# Patient Record
Sex: FEMALE | Race: WHITE | NOT HISPANIC OR LATINO | Employment: FULL TIME | URBAN - METROPOLITAN AREA
[De-identification: names, ages, dates, MRNs, and addresses within clinical notes are randomized per-mention and may not be internally consistent; named-entity substitution may affect disease eponyms.]

---

## 2019-01-03 ENCOUNTER — HISTORICAL (OUTPATIENT)
Dept: ADMINISTRATIVE | Facility: HOSPITAL | Age: 39
End: 2019-01-03

## 2019-01-03 LAB
ABS NEUT (OLG): 5.3 X10(3)/MCL (ref 2.1–9.2)
ALBUMIN SERPL-MCNC: 4.5 GM/DL (ref 3.4–5)
ALBUMIN/GLOB SERPL: 1.73 {RATIO} (ref 1.5–2.5)
ALP SERPL-CCNC: 55 UNIT/L (ref 38–126)
ALT SERPL-CCNC: 14 UNIT/L (ref 7–52)
AST SERPL-CCNC: 18 UNIT/L (ref 15–37)
BILIRUB SERPL-MCNC: 0.4 MG/DL (ref 0.2–1)
BILIRUBIN DIRECT+TOT PNL SERPL-MCNC: 0.1 MG/DL (ref 0–0.5)
BILIRUBIN DIRECT+TOT PNL SERPL-MCNC: 0.3 MG/DL
BUN SERPL-MCNC: 8 MG/DL (ref 7–18)
CALCIUM SERPL-MCNC: 9.1 MG/DL (ref 8.5–10)
CHLORIDE SERPL-SCNC: 107 MMOL/L (ref 98–107)
CO2 SERPL-SCNC: 23 MMOL/L (ref 21–32)
CREAT SERPL-MCNC: 0.71 MG/DL (ref 0.6–1.3)
ERYTHROCYTE [DISTWIDTH] IN BLOOD BY AUTOMATED COUNT: 11.4 % (ref 11.5–17)
GLOBULIN SER-MCNC: 2.6 GM/DL (ref 1.2–3)
GLUCOSE SERPL-MCNC: 102 MG/DL (ref 74–106)
HCT VFR BLD AUTO: 43.9 % (ref 37–47)
HGB BLD-MCNC: 13.3 GM/DL (ref 12–16)
LYMPHOCYTES # BLD AUTO: 1.6 X10(3)/MCL (ref 0.6–3.4)
LYMPHOCYTES NFR BLD AUTO: 20.6 % (ref 13–40)
MCH RBC QN AUTO: 30.3 PG (ref 27–31.2)
MCHC RBC AUTO-ENTMCNC: 30 GM/DL (ref 32–36)
MCV RBC AUTO: 100 FL (ref 80–94)
MONOCYTES # BLD AUTO: 0.7 X10(3)/MCL (ref 0.1–1.3)
MONOCYTES NFR BLD AUTO: 9.2 % (ref 0.1–24)
NEUTROPHILS NFR BLD AUTO: 70.2 % (ref 47–80)
PLATELET # BLD AUTO: 233 X10(3)/MCL (ref 130–400)
PMV BLD AUTO: 10.7 FL (ref 9.4–12.4)
POTASSIUM SERPL-SCNC: 4.2 MMOL/L (ref 3.5–5.1)
PROT SERPL-MCNC: 7.1 GM/DL (ref 6.4–8.2)
RBC # BLD AUTO: 4.39 X10(6)/MCL (ref 4.2–5.4)
SODIUM SERPL-SCNC: 138 MMOL/L (ref 136–145)
TSH SERPL-ACNC: 1.49 MIU/ML (ref 0.35–4.94)
WBC # SPEC AUTO: 7.6 X10(3)/MCL (ref 4.5–11.5)

## 2019-01-16 ENCOUNTER — HISTORICAL (OUTPATIENT)
Dept: ADMINISTRATIVE | Facility: HOSPITAL | Age: 39
End: 2019-01-16

## 2019-01-16 LAB
CHOLEST SERPL-MCNC: 155 MG/DL (ref 0–200)
CHOLEST/HDLC SERPL: 2.7 {RATIO} (ref 0–4)
D DIMER PPP IA.FEU-MCNC: <270 NG/ML FEU (ref 0–500)
HDLC SERPL-MCNC: 58 MG/DL (ref 35–60)
LDLC SERPL CALC-MCNC: 84 MG/DL (ref 0–129)
MAGNESIUM SERPL-MCNC: 2.3 MG/DL (ref 1.8–2.4)
TRIGL SERPL-MCNC: 66 MG/DL (ref 30–150)
VLDLC SERPL CALC-MCNC: 13 MG/DL

## 2021-03-17 LAB — PAP RECOMMENDATION EXT: NORMAL

## 2021-04-26 ENCOUNTER — HISTORICAL (OUTPATIENT)
Dept: ADMINISTRATIVE | Facility: HOSPITAL | Age: 41
End: 2021-04-26

## 2021-04-26 LAB
ABS NEUT (OLG): 3.51 X10(3)/MCL (ref 2.1–9.2)
ALBUMIN SERPL-MCNC: 4.4 GM/DL (ref 3.5–5)
ALBUMIN/GLOB SERPL: 1.8 RATIO (ref 1.1–2)
ALP SERPL-CCNC: 73 UNIT/L (ref 40–150)
ALT SERPL-CCNC: 17 UNIT/L (ref 0–55)
AST SERPL-CCNC: 19 UNIT/L (ref 5–34)
BASOPHILS # BLD AUTO: 0 X10(3)/MCL (ref 0–0.2)
BASOPHILS NFR BLD AUTO: 0 %
BILIRUB SERPL-MCNC: 0.3 MG/DL
BILIRUBIN DIRECT+TOT PNL SERPL-MCNC: 0.1 MG/DL (ref 0–0.5)
BILIRUBIN DIRECT+TOT PNL SERPL-MCNC: 0.2 MG/DL (ref 0–0.8)
BUN SERPL-MCNC: 9.3 MG/DL (ref 7–18.7)
CALCIUM SERPL-MCNC: 9.3 MG/DL (ref 8.4–10.2)
CHLORIDE SERPL-SCNC: 105 MMOL/L (ref 98–107)
CO2 SERPL-SCNC: 26 MMOL/L (ref 22–29)
CREAT SERPL-MCNC: 0.68 MG/DL (ref 0.55–1.02)
EOSINOPHIL # BLD AUTO: 0.1 X10(3)/MCL (ref 0–0.9)
EOSINOPHIL NFR BLD AUTO: 1 %
ERYTHROCYTE [DISTWIDTH] IN BLOOD BY AUTOMATED COUNT: 11.9 % (ref 11.5–17)
GLOBULIN SER-MCNC: 2.5 GM/DL (ref 2.4–3.5)
GLUCOSE SERPL-MCNC: 61 MG/DL (ref 74–100)
HCT VFR BLD AUTO: 41.9 % (ref 37–47)
HGB BLD-MCNC: 13.2 GM/DL (ref 12–16)
LYMPHOCYTES # BLD AUTO: 2.2 X10(3)/MCL (ref 0.6–4.6)
LYMPHOCYTES NFR BLD AUTO: 34 %
MCH RBC QN AUTO: 30.1 PG (ref 27–31)
MCHC RBC AUTO-ENTMCNC: 31.5 GM/DL (ref 33–36)
MCV RBC AUTO: 95.7 FL (ref 80–94)
MONOCYTES # BLD AUTO: 0.6 X10(3)/MCL (ref 0.1–1.3)
MONOCYTES NFR BLD AUTO: 9 %
NEUTROPHILS # BLD AUTO: 3.51 X10(3)/MCL (ref 2.1–9.2)
NEUTROPHILS NFR BLD AUTO: 54 %
PLATELET # BLD AUTO: 284 X10(3)/MCL (ref 130–400)
PMV BLD AUTO: 11.4 FL (ref 9.4–12.4)
POTASSIUM SERPL-SCNC: 4.1 MMOL/L (ref 3.5–5.1)
PROT SERPL-MCNC: 6.9 GM/DL (ref 6.4–8.3)
RBC # BLD AUTO: 4.38 X10(6)/MCL (ref 4.2–5.4)
SODIUM SERPL-SCNC: 142 MMOL/L (ref 136–145)
TSH SERPL-ACNC: 1.29 UIU/ML (ref 0.35–4.94)
WBC # SPEC AUTO: 6.5 X10(3)/MCL (ref 4.5–11.5)

## 2021-10-19 ENCOUNTER — HISTORICAL (OUTPATIENT)
Dept: ADMINISTRATIVE | Facility: HOSPITAL | Age: 41
End: 2021-10-19

## 2021-10-19 LAB
CHOLEST SERPL-MCNC: 176 MG/DL
CHOLEST/HDLC SERPL: 3 {RATIO} (ref 0–5)
DEPRECATED CALCIDIOL+CALCIFEROL SERPL-MC: 27.3 NG/ML (ref 30–80)
HDLC SERPL-MCNC: 55 MG/DL (ref 35–60)
LDLC SERPL CALC-MCNC: 110 MG/DL (ref 50–140)
TRIGL SERPL-MCNC: 57 MG/DL (ref 37–140)
VLDLC SERPL CALC-MCNC: 11 MG/DL

## 2022-04-10 ENCOUNTER — HISTORICAL (OUTPATIENT)
Dept: ADMINISTRATIVE | Facility: HOSPITAL | Age: 42
End: 2022-04-10

## 2022-04-29 VITALS
OXYGEN SATURATION: 98 % | SYSTOLIC BLOOD PRESSURE: 122 MMHG | HEIGHT: 69 IN | BODY MASS INDEX: 25.03 KG/M2 | WEIGHT: 169 LBS | DIASTOLIC BLOOD PRESSURE: 72 MMHG

## 2022-10-04 DIAGNOSIS — E55.9 VITAMIN D DEFICIENCY: ICD-10-CM

## 2022-10-04 DIAGNOSIS — Z00.00 WELLNESS EXAMINATION: Primary | ICD-10-CM

## 2022-10-04 RX ORDER — METOPROLOL SUCCINATE 25 MG/1
1 TABLET, EXTENDED RELEASE ORAL DAILY
COMMUNITY
Start: 2022-07-26

## 2022-10-05 ENCOUNTER — TELEPHONE (OUTPATIENT)
Dept: INTERNAL MEDICINE | Facility: CLINIC | Age: 42
End: 2022-10-05

## 2022-10-07 ENCOUNTER — LAB VISIT (OUTPATIENT)
Dept: LAB | Facility: HOSPITAL | Age: 42
End: 2022-10-07
Attending: INTERNAL MEDICINE
Payer: COMMERCIAL

## 2022-10-07 DIAGNOSIS — Z00.00 WELLNESS EXAMINATION: ICD-10-CM

## 2022-10-07 DIAGNOSIS — E55.9 VITAMIN D DEFICIENCY: ICD-10-CM

## 2022-10-07 LAB
ALBUMIN SERPL-MCNC: 4.2 GM/DL (ref 3.5–5)
ALBUMIN/GLOB SERPL: 1.8 RATIO (ref 1.1–2)
ALP SERPL-CCNC: 55 UNIT/L (ref 40–150)
ALT SERPL-CCNC: 14 UNIT/L (ref 0–55)
AST SERPL-CCNC: 14 UNIT/L (ref 5–34)
BASOPHILS # BLD AUTO: 0.04 X10(3)/MCL (ref 0–0.2)
BASOPHILS NFR BLD AUTO: 0.6 %
BILIRUBIN DIRECT+TOT PNL SERPL-MCNC: 0.9 MG/DL
BUN SERPL-MCNC: 11.7 MG/DL (ref 7–18.7)
CALCIUM SERPL-MCNC: 9.6 MG/DL (ref 8.4–10.2)
CHLORIDE SERPL-SCNC: 108 MMOL/L (ref 98–107)
CHOLEST SERPL-MCNC: 144 MG/DL
CHOLEST/HDLC SERPL: 3 {RATIO} (ref 0–5)
CO2 SERPL-SCNC: 28 MMOL/L (ref 22–29)
CREAT SERPL-MCNC: 0.69 MG/DL (ref 0.55–1.02)
DEPRECATED CALCIDIOL+CALCIFEROL SERPL-MC: 29.7 NG/ML (ref 30–80)
EOSINOPHIL # BLD AUTO: 0.1 X10(3)/MCL (ref 0–0.9)
EOSINOPHIL NFR BLD AUTO: 1.5 %
ERYTHROCYTE [DISTWIDTH] IN BLOOD BY AUTOMATED COUNT: 11.6 % (ref 11.5–17)
EST. AVERAGE GLUCOSE BLD GHB EST-MCNC: 93.9 MG/DL
GFR SERPLBLD CREATININE-BSD FMLA CKD-EPI: >60 MLS/MIN/1.73/M2
GLOBULIN SER-MCNC: 2.4 GM/DL (ref 2.4–3.5)
GLUCOSE SERPL-MCNC: 82 MG/DL (ref 74–100)
HBA1C MFR BLD: 4.9 %
HCT VFR BLD AUTO: 41.2 % (ref 37–47)
HDLC SERPL-MCNC: 50 MG/DL (ref 35–60)
HGB BLD-MCNC: 13 GM/DL (ref 12–16)
IMM GRANULOCYTES # BLD AUTO: 0.01 X10(3)/MCL (ref 0–0.04)
IMM GRANULOCYTES NFR BLD AUTO: 0.2 %
LDLC SERPL CALC-MCNC: 85 MG/DL (ref 50–140)
LYMPHOCYTES # BLD AUTO: 2.15 X10(3)/MCL (ref 0.6–4.6)
LYMPHOCYTES NFR BLD AUTO: 32.6 %
MCH RBC QN AUTO: 29.6 PG (ref 27–31)
MCHC RBC AUTO-ENTMCNC: 31.6 MG/DL (ref 33–36)
MCV RBC AUTO: 93.8 FL (ref 80–94)
MONOCYTES # BLD AUTO: 0.6 X10(3)/MCL (ref 0.1–1.3)
MONOCYTES NFR BLD AUTO: 9.1 %
NEUTROPHILS # BLD AUTO: 3.7 X10(3)/MCL (ref 2.1–9.2)
NEUTROPHILS NFR BLD AUTO: 56 %
NRBC BLD AUTO-RTO: 0 %
PLATELET # BLD AUTO: 242 X10(3)/MCL (ref 130–400)
PMV BLD AUTO: 11.4 FL (ref 7.4–10.4)
POTASSIUM SERPL-SCNC: 4.4 MMOL/L (ref 3.5–5.1)
PROT SERPL-MCNC: 6.6 GM/DL (ref 6.4–8.3)
RBC # BLD AUTO: 4.39 X10(6)/MCL (ref 4.2–5.4)
SODIUM SERPL-SCNC: 141 MMOL/L (ref 136–145)
TRIGL SERPL-MCNC: 45 MG/DL (ref 37–140)
TSH SERPL-ACNC: 0.93 UIU/ML (ref 0.35–4.94)
VLDLC SERPL CALC-MCNC: 9 MG/DL
WBC # SPEC AUTO: 6.6 X10(3)/MCL (ref 4.5–11.5)

## 2022-10-07 PROCEDURE — 82306 VITAMIN D 25 HYDROXY: CPT

## 2022-10-07 PROCEDURE — 84443 ASSAY THYROID STIM HORMONE: CPT

## 2022-10-07 PROCEDURE — 36415 COLL VENOUS BLD VENIPUNCTURE: CPT

## 2022-10-07 PROCEDURE — 80053 COMPREHEN METABOLIC PANEL: CPT

## 2022-10-07 PROCEDURE — 83036 HEMOGLOBIN GLYCOSYLATED A1C: CPT

## 2022-10-07 PROCEDURE — 80061 LIPID PANEL: CPT

## 2022-10-07 PROCEDURE — 85025 COMPLETE CBC W/AUTO DIFF WBC: CPT

## 2022-10-08 ENCOUNTER — DOCUMENTATION ONLY (OUTPATIENT)
Dept: INTERNAL MEDICINE | Facility: CLINIC | Age: 42
End: 2022-10-08
Payer: COMMERCIAL

## 2022-10-13 ENCOUNTER — OFFICE VISIT (OUTPATIENT)
Dept: INTERNAL MEDICINE | Facility: CLINIC | Age: 42
End: 2022-10-13
Payer: COMMERCIAL

## 2022-10-13 VITALS
OXYGEN SATURATION: 99 % | HEART RATE: 74 BPM | WEIGHT: 148.19 LBS | SYSTOLIC BLOOD PRESSURE: 120 MMHG | DIASTOLIC BLOOD PRESSURE: 74 MMHG | TEMPERATURE: 98 F | HEIGHT: 68 IN | BODY MASS INDEX: 22.46 KG/M2

## 2022-10-13 DIAGNOSIS — I47.10 SVT (SUPRAVENTRICULAR TACHYCARDIA): ICD-10-CM

## 2022-10-13 DIAGNOSIS — F32.A ANXIETY AND DEPRESSION: ICD-10-CM

## 2022-10-13 DIAGNOSIS — F41.9 ANXIETY AND DEPRESSION: ICD-10-CM

## 2022-10-13 DIAGNOSIS — Z00.00 WELLNESS EXAMINATION: ICD-10-CM

## 2022-10-13 DIAGNOSIS — Z23 NEED FOR VACCINATION: Primary | ICD-10-CM

## 2022-10-13 PROCEDURE — 90471 IMMUNIZATION ADMIN: CPT | Mod: ,,, | Performed by: INTERNAL MEDICINE

## 2022-10-13 PROCEDURE — 90686 IIV4 VACC NO PRSV 0.5 ML IM: CPT | Mod: ,,, | Performed by: INTERNAL MEDICINE

## 2022-10-13 PROCEDURE — 1159F MED LIST DOCD IN RCRD: CPT | Mod: CPTII,,, | Performed by: INTERNAL MEDICINE

## 2022-10-13 PROCEDURE — 99212 OFFICE O/P EST SF 10 MIN: CPT | Mod: 25,,, | Performed by: INTERNAL MEDICINE

## 2022-10-13 PROCEDURE — 3078F PR MOST RECENT DIASTOLIC BLOOD PRESSURE < 80 MM HG: ICD-10-PCS | Mod: CPTII,,, | Performed by: INTERNAL MEDICINE

## 2022-10-13 PROCEDURE — 1159F PR MEDICATION LIST DOCUMENTED IN MEDICAL RECORD: ICD-10-PCS | Mod: CPTII,,, | Performed by: INTERNAL MEDICINE

## 2022-10-13 PROCEDURE — 99395 PREV VISIT EST AGE 18-39: CPT | Mod: 25,,, | Performed by: INTERNAL MEDICINE

## 2022-10-13 PROCEDURE — 1160F PR REVIEW ALL MEDS BY PRESCRIBER/CLIN PHARMACIST DOCUMENTED: ICD-10-PCS | Mod: CPTII,,, | Performed by: INTERNAL MEDICINE

## 2022-10-13 PROCEDURE — 99395 PR PREVENTIVE VISIT,EST,18-39: ICD-10-PCS | Mod: 25,,, | Performed by: INTERNAL MEDICINE

## 2022-10-13 PROCEDURE — 3074F SYST BP LT 130 MM HG: CPT | Mod: CPTII,,, | Performed by: INTERNAL MEDICINE

## 2022-10-13 PROCEDURE — 1160F RVW MEDS BY RX/DR IN RCRD: CPT | Mod: CPTII,,, | Performed by: INTERNAL MEDICINE

## 2022-10-13 PROCEDURE — 90471 FLU VACCINE (QUAD) GREATER THAN OR EQUAL TO 3YO PRESERVATIVE FREE IM: ICD-10-PCS | Mod: ,,, | Performed by: INTERNAL MEDICINE

## 2022-10-13 PROCEDURE — 3074F PR MOST RECENT SYSTOLIC BLOOD PRESSURE < 130 MM HG: ICD-10-PCS | Mod: CPTII,,, | Performed by: INTERNAL MEDICINE

## 2022-10-13 PROCEDURE — 3078F DIAST BP <80 MM HG: CPT | Mod: CPTII,,, | Performed by: INTERNAL MEDICINE

## 2022-10-13 PROCEDURE — 99212 PR OFFICE/OUTPT VISIT, EST, LEVL II, 10-19 MIN: ICD-10-PCS | Mod: 25,,, | Performed by: INTERNAL MEDICINE

## 2022-10-13 PROCEDURE — 90686 FLU VACCINE (QUAD) GREATER THAN OR EQUAL TO 3YO PRESERVATIVE FREE IM: ICD-10-PCS | Mod: ,,, | Performed by: INTERNAL MEDICINE

## 2022-10-13 NOTE — ASSESSMENT & PLAN NOTE
-status post ablation and now on metoprolol and remaining stable   -continue treatment with metoprolol

## 2022-10-13 NOTE — ASSESSMENT & PLAN NOTE
Continue on metoprolol which should help with the palpitations   Refraining from adding any pharmacological treatment however patient is advised to call if the measures discussed below do not help in symptom improvement   -will consider low dose of Celexa at that time    Practice deep breathing or abdominal breathing exercises when anxiety occurs.  Exercise daily. Get sunlight daily.  Avoid caffeine, alcohol and stimulants.  Practice positive phrases and repeat throughout the day, along with yoga and relaxation techniques.  Set healthy boundaries, avoid people and conversations that increase stress.  Educated patient on the risks of serotonin based medications such as serotonin modulators and SSRIs/SNRIs including common side effects of nausea, GI upset, headache dizziness as well as the rare risk for worsening symptoms of depression including development of suicidal thoughts or ideations, and serotonin syndrome.

## 2022-12-07 LAB — BCS RECOMMENDATION EXT: NORMAL

## 2022-12-10 ENCOUNTER — DOCUMENTATION ONLY (OUTPATIENT)
Dept: INTERNAL MEDICINE | Facility: CLINIC | Age: 42
End: 2022-12-10
Payer: COMMERCIAL

## 2023-01-16 PROBLEM — Z00.00 WELLNESS EXAMINATION: Status: RESOLVED | Noted: 2022-10-13 | Resolved: 2023-01-16

## 2023-04-05 ENCOUNTER — TELEPHONE (OUTPATIENT)
Dept: INTERNAL MEDICINE | Facility: CLINIC | Age: 43
End: 2023-04-05
Payer: COMMERCIAL

## 2023-04-13 ENCOUNTER — OFFICE VISIT (OUTPATIENT)
Dept: INTERNAL MEDICINE | Facility: CLINIC | Age: 43
End: 2023-04-13
Payer: COMMERCIAL

## 2023-04-13 VITALS
HEART RATE: 62 BPM | BODY MASS INDEX: 22.58 KG/M2 | HEIGHT: 68 IN | OXYGEN SATURATION: 99 % | WEIGHT: 149 LBS | SYSTOLIC BLOOD PRESSURE: 122 MMHG | DIASTOLIC BLOOD PRESSURE: 70 MMHG | TEMPERATURE: 99 F

## 2023-04-13 DIAGNOSIS — E55.9 VITAMIN D DEFICIENCY: Chronic | ICD-10-CM

## 2023-04-13 DIAGNOSIS — I47.10 SVT (SUPRAVENTRICULAR TACHYCARDIA): Chronic | ICD-10-CM

## 2023-04-13 DIAGNOSIS — F32.A ANXIETY AND DEPRESSION: Primary | Chronic | ICD-10-CM

## 2023-04-13 DIAGNOSIS — F41.9 ANXIETY AND DEPRESSION: Primary | Chronic | ICD-10-CM

## 2023-04-13 PROCEDURE — 1159F PR MEDICATION LIST DOCUMENTED IN MEDICAL RECORD: ICD-10-PCS | Mod: CPTII,,,

## 2023-04-13 PROCEDURE — 1159F MED LIST DOCD IN RCRD: CPT | Mod: CPTII,,,

## 2023-04-13 PROCEDURE — 3074F PR MOST RECENT SYSTOLIC BLOOD PRESSURE < 130 MM HG: ICD-10-PCS | Mod: CPTII,,,

## 2023-04-13 PROCEDURE — 1160F RVW MEDS BY RX/DR IN RCRD: CPT | Mod: CPTII,,,

## 2023-04-13 PROCEDURE — 3008F PR BODY MASS INDEX (BMI) DOCUMENTED: ICD-10-PCS | Mod: CPTII,,,

## 2023-04-13 PROCEDURE — 99214 PR OFFICE/OUTPT VISIT, EST, LEVL IV, 30-39 MIN: ICD-10-PCS | Mod: ,,,

## 2023-04-13 PROCEDURE — 1160F PR REVIEW ALL MEDS BY PRESCRIBER/CLIN PHARMACIST DOCUMENTED: ICD-10-PCS | Mod: CPTII,,,

## 2023-04-13 PROCEDURE — 3008F BODY MASS INDEX DOCD: CPT | Mod: CPTII,,,

## 2023-04-13 PROCEDURE — 99214 OFFICE O/P EST MOD 30 MIN: CPT | Mod: ,,,

## 2023-04-13 PROCEDURE — 3074F SYST BP LT 130 MM HG: CPT | Mod: CPTII,,,

## 2023-04-13 PROCEDURE — 3078F DIAST BP <80 MM HG: CPT | Mod: CPTII,,,

## 2023-04-13 PROCEDURE — 3078F PR MOST RECENT DIASTOLIC BLOOD PRESSURE < 80 MM HG: ICD-10-PCS | Mod: CPTII,,,

## 2023-04-13 NOTE — PROGRESS NOTES
Patient ID: Lyndsay Alicia is a 42 y.o. female.    Chief Complaint: Follow-up (6 mth f/u anxiety)    42-year-old female with SVT s/p ablation, anxiety, and vitamin-D deficiency here today for six-month follow-up visit.  In the past patient has had a cardiac work-up including an echocardiogram etc. which were all returned negative. However she had been having on and off tachycardia with heart rate sometimes going in the 200+ range. Was seen by Dr. Shaikh after a 2-week Holter monitor and is s/p ablation of her SVT.   Since last visit patient reports she is been fairly well without major injury or illness.  Does have some ongoing anxiety/heightened stresses, however still somewhat hesitant to initiate pharmacologic intervention.  Is open to homeopathic treatment modalities for anxiety/depression.  Discussed trial on Saint Ron's Wort  and Ashwagandha.  Does have vitamin-D deficiency subsequently encouraged to utilize OTC vitamin D 5000 units daily.  Otherwise patient fairly stable without acute concerns today.       Wellness: 10/13/22    Electrophysiology MD: Dr. Vitale      MEDICAL HISTORY:    Past Medical History:   Diagnosis Date    Pain     Vitamin D deficiency       Past Surgical History:   Procedure Laterality Date    CARDIAC ELECTROPHYSIOLOGY MAPPING AND ABLATION      EYE SURGERY  2006    I dont remember exactly      Social History     Tobacco Use    Smoking status: Never    Smokeless tobacco: Never   Substance Use Topics    Alcohol use: Not Currently          Health Maintenance Due   Topic Date Due    Hepatitis C Screening  Never done          Patient Care Team:  Amaya Feliciano MD as PCP - General (Internal Medicine)      Review of Systems   Constitutional:  Negative for fatigue and fever.   HENT:  Negative for congestion, rhinorrhea, sore throat and trouble swallowing.    Eyes:  Negative for redness and visual disturbance.   Respiratory:  Negative for cough, chest tightness and shortness of  "breath.    Cardiovascular:  Negative for chest pain and palpitations.   Gastrointestinal:  Negative for abdominal pain, constipation, diarrhea, nausea and vomiting.   Genitourinary:  Negative for dysuria, flank pain, frequency and urgency.   Musculoskeletal:  Negative for arthralgias, gait problem and myalgias.   Skin:  Negative for rash and wound.   Neurological:  Negative for facial asymmetry, speech difficulty, weakness and headaches.   Psychiatric/Behavioral:  The patient is nervous/anxious.    All other systems reviewed and are negative.    Objective:   /70 (BP Location: Left arm, Patient Position: Sitting, BP Method: Large (Manual))   Pulse 62   Temp 98.7 °F (37.1 °C)   Ht 5' 7.99" (1.727 m)   Wt 67.6 kg (149 lb)   SpO2 99%   BMI 22.66 kg/m²      Physical Exam  Constitutional:       General: She is not in acute distress.     Appearance: Normal appearance.   HENT:      Right Ear: Tympanic membrane, ear canal and external ear normal.      Left Ear: Tympanic membrane, ear canal and external ear normal.      Nose: Nose normal.      Mouth/Throat:      Mouth: Mucous membranes are moist.      Pharynx: Oropharynx is clear.   Eyes:      Extraocular Movements: Extraocular movements intact.      Conjunctiva/sclera: Conjunctivae normal.      Pupils: Pupils are equal, round, and reactive to light.   Cardiovascular:      Rate and Rhythm: Normal rate and regular rhythm.      Pulses: Normal pulses.      Heart sounds: Normal heart sounds. No murmur heard.    No gallop.   Pulmonary:      Effort: Pulmonary effort is normal.      Breath sounds: Normal breath sounds. No wheezing.   Abdominal:      General: Bowel sounds are normal. There is no distension.      Palpations: Abdomen is soft. There is no mass.      Tenderness: There is no abdominal tenderness. There is no guarding.   Musculoskeletal:         General: Normal range of motion.   Skin:     General: Skin is warm and dry.   Neurological:      Mental Status: She " is alert. Mental status is at baseline.      Sensory: No sensory deficit.      Motor: No weakness.         Assessment:       ICD-10-CM ICD-9-CM   1. Anxiety and depression  F41.9 300.00    F32.A 311   2. SVT (supraventricular tachycardia)  I47.1 427.89   3. Vitamin D deficiency  E55.9 268.9        Plan:     Problem List Items Addressed This Visit          Psychiatric    Anxiety and depression - Primary (Chronic)     -recent titrate homeopathic Pavan   -encouraged to utilize OTC Ashwagandha and Saint Ron's Wort   -also treating vitamin-D deficiency at the moment  -does take beta-blocker for SVT which may potentially helpful as well           Relevant Orders    TSH    Lipid Panel    Comprehensive Metabolic Panel    CBC Auto Differential    Vitamin D    Hemoglobin A1C       Cardiac/Vascular    SVT (supraventricular tachycardia) (Chronic)     -stable   -currently metoprolol succinate 25 mg daily, continue  -avoid energy drinks and excessive caffeine           Relevant Orders    TSH    Lipid Panel    Comprehensive Metabolic Panel    CBC Auto Differential    Vitamin D    Hemoglobin A1C       Endocrine    Vitamin D deficiency (Chronic)     -encouraged to utilize OTC vitamin-D 5000 units daily   -order vitamin-D level for next visit           Relevant Orders    TSH    Lipid Panel    Comprehensive Metabolic Panel    CBC Auto Differential    Vitamin D    Hemoglobin A1C          Follow up in about 6 months (around 10/13/2023) for \Wellness with labs prior to visit, Wellness with labs prior to visit.   -plan specifics discussed above    Orders Placed This Encounter    TSH    Lipid Panel    Comprehensive Metabolic Panel    CBC Auto Differential    Vitamin D    Hemoglobin A1C        Medication List with Changes/Refills   Current Medications    METOPROLOL SUCCINATE (TOPROL-XL) 25 MG 24 HR TABLET    Take 1 tablet by mouth Daily.

## 2023-04-13 NOTE — ASSESSMENT & PLAN NOTE
-stable   -currently metoprolol succinate 25 mg daily, continue  -avoid energy drinks and excessive caffeine

## 2023-04-13 NOTE — ASSESSMENT & PLAN NOTE
-recent titrate homeopathic Pavan   -encouraged to utilize OTC Ashwagandha and Saint Ron's Wort   -also treating vitamin-D deficiency at the moment  -does take beta-blocker for SVT which may potentially helpful as well

## 2023-07-11 ENCOUNTER — TELEPHONE (OUTPATIENT)
Dept: INTERNAL MEDICINE | Facility: CLINIC | Age: 43
End: 2023-07-11
Payer: COMMERCIAL

## 2023-07-11 NOTE — TELEPHONE ENCOUNTER
----- Message from Gwen Hatfield sent at 7/11/2023  8:41 AM CDT -----  .Type:  Needs Medical Advice    Who Called: pt  Symptoms (please be specific):    How long has patient had these symptoms:    Pharmacy name and phone #:    Would the patient rather a call back or a response via [facility]?   Best Call Back Number: [phone]  Additional Information: pt asking for Pcp give her call it's personal question that needs to be ask

## 2023-07-11 NOTE — TELEPHONE ENCOUNTER
I do not have any specific recommendations for therapist however they can [facility] the [facility] today and see what therapist would fit best for their needs.

## 2023-07-11 NOTE — TELEPHONE ENCOUNTER
Patient was told she can make appointment with [facility] [phone]. She will call to make appointment

## 2023-07-11 NOTE — TELEPHONE ENCOUNTER
----- Message from Gwen Hatfield sent at 7/11/2023  8:41 AM CDT -----  .Type:  Needs Medical Advice    Who Called: pt  Symptoms (please be specific):    How long has patient had these symptoms:    Pharmacy name and phone #:    Would the patient rather a call back or a response via [facility]?   Best Call Back Number: [phone]  Additional Information: pt asking for Pcp give her call it's personal question that needs to be ask

## 2023-08-16 LAB
HUMAN PAPILLOMAVIRUS (HPV): NORMAL
PAP RECOMMENDATION EXT: NORMAL
PAP SMEAR: NORMAL

## 2023-10-10 ENCOUNTER — TELEPHONE (OUTPATIENT)
Dept: INTERNAL MEDICINE | Facility: CLINIC | Age: 43
End: 2023-10-10
Payer: COMMERCIAL

## 2023-10-10 ENCOUNTER — LAB VISIT (OUTPATIENT)
Dept: LAB | Facility: HOSPITAL | Age: 43
End: 2023-10-10
Payer: COMMERCIAL

## 2023-10-10 DIAGNOSIS — I47.10 SVT (SUPRAVENTRICULAR TACHYCARDIA): ICD-10-CM

## 2023-10-10 DIAGNOSIS — F32.A ANXIETY AND DEPRESSION: ICD-10-CM

## 2023-10-10 DIAGNOSIS — F41.9 ANXIETY AND DEPRESSION: ICD-10-CM

## 2023-10-10 DIAGNOSIS — E55.9 VITAMIN D DEFICIENCY: ICD-10-CM

## 2023-10-10 LAB
ALBUMIN SERPL-MCNC: 4 G/DL (ref 3.5–5)
ALBUMIN/GLOB SERPL: 1.6 RATIO (ref 1.1–2)
ALP SERPL-CCNC: 51 UNIT/L (ref 40–150)
ALT SERPL-CCNC: 14 UNIT/L (ref 0–55)
AST SERPL-CCNC: 15 UNIT/L (ref 5–34)
BASOPHILS # BLD AUTO: 0.03 X10(3)/MCL
BASOPHILS NFR BLD AUTO: 0.6 %
BILIRUB SERPL-MCNC: 0.7 MG/DL
BUN SERPL-MCNC: 17 MG/DL (ref 7–18.7)
CALCIUM SERPL-MCNC: 9.4 MG/DL (ref 8.4–10.2)
CHLORIDE SERPL-SCNC: 107 MMOL/L (ref 98–107)
CHOLEST SERPL-MCNC: 156 MG/DL
CHOLEST/HDLC SERPL: 3 {RATIO} (ref 0–5)
CO2 SERPL-SCNC: 27 MMOL/L (ref 22–29)
CREAT SERPL-MCNC: 0.73 MG/DL (ref 0.55–1.02)
DEPRECATED CALCIDIOL+CALCIFEROL SERPL-MC: 61.4 NG/ML (ref 30–80)
EOSINOPHIL # BLD AUTO: 0.07 X10(3)/MCL (ref 0–0.9)
EOSINOPHIL NFR BLD AUTO: 1.4 %
ERYTHROCYTE [DISTWIDTH] IN BLOOD BY AUTOMATED COUNT: 11.5 % (ref 11.5–17)
EST. AVERAGE GLUCOSE BLD GHB EST-MCNC: 93.9 MG/DL
GFR SERPLBLD CREATININE-BSD FMLA CKD-EPI: >60 MLS/MIN/1.73/M2
GLOBULIN SER-MCNC: 2.5 GM/DL (ref 2.4–3.5)
GLUCOSE SERPL-MCNC: 94 MG/DL (ref 74–100)
HBA1C MFR BLD: 4.9 %
HCT VFR BLD AUTO: 40.7 % (ref 37–47)
HDLC SERPL-MCNC: 46 MG/DL (ref 35–60)
HGB BLD-MCNC: 13 G/DL (ref 12–16)
IMM GRANULOCYTES # BLD AUTO: 0.01 X10(3)/MCL (ref 0–0.04)
IMM GRANULOCYTES NFR BLD AUTO: 0.2 %
LDLC SERPL CALC-MCNC: 95 MG/DL (ref 50–140)
LYMPHOCYTES # BLD AUTO: 1.52 X10(3)/MCL (ref 0.6–4.6)
LYMPHOCYTES NFR BLD AUTO: 31 %
MCH RBC QN AUTO: 29.8 PG (ref 27–31)
MCHC RBC AUTO-ENTMCNC: 31.9 G/DL (ref 33–36)
MCV RBC AUTO: 93.3 FL (ref 80–94)
MONOCYTES # BLD AUTO: 0.48 X10(3)/MCL (ref 0.1–1.3)
MONOCYTES NFR BLD AUTO: 9.8 %
NEUTROPHILS # BLD AUTO: 2.79 X10(3)/MCL (ref 2.1–9.2)
NEUTROPHILS NFR BLD AUTO: 57 %
NRBC BLD AUTO-RTO: 0 %
PLATELET # BLD AUTO: 234 X10(3)/MCL (ref 130–400)
PMV BLD AUTO: 11 FL (ref 7.4–10.4)
POTASSIUM SERPL-SCNC: 4.2 MMOL/L (ref 3.5–5.1)
PROT SERPL-MCNC: 6.5 GM/DL (ref 6.4–8.3)
RBC # BLD AUTO: 4.36 X10(6)/MCL (ref 4.2–5.4)
SODIUM SERPL-SCNC: 140 MMOL/L (ref 136–145)
TRIGL SERPL-MCNC: 77 MG/DL (ref 37–140)
TSH SERPL-ACNC: 1.78 UIU/ML (ref 0.35–4.94)
VLDLC SERPL CALC-MCNC: 15 MG/DL
WBC # SPEC AUTO: 4.9 X10(3)/MCL (ref 4.5–11.5)

## 2023-10-10 PROCEDURE — 80053 COMPREHEN METABOLIC PANEL: CPT

## 2023-10-10 PROCEDURE — 36415 COLL VENOUS BLD VENIPUNCTURE: CPT

## 2023-10-10 PROCEDURE — 85025 COMPLETE CBC W/AUTO DIFF WBC: CPT

## 2023-10-10 PROCEDURE — 84443 ASSAY THYROID STIM HORMONE: CPT

## 2023-10-10 PROCEDURE — 83036 HEMOGLOBIN GLYCOSYLATED A1C: CPT

## 2023-10-10 PROCEDURE — 82306 VITAMIN D 25 HYDROXY: CPT

## 2023-10-10 PROCEDURE — 80061 LIPID PANEL: CPT

## 2023-10-17 ENCOUNTER — OFFICE VISIT (OUTPATIENT)
Dept: INTERNAL MEDICINE | Facility: CLINIC | Age: 43
End: 2023-10-17
Payer: COMMERCIAL

## 2023-10-17 VITALS
HEIGHT: 67 IN | DIASTOLIC BLOOD PRESSURE: 64 MMHG | BODY MASS INDEX: 23.39 KG/M2 | HEART RATE: 80 BPM | TEMPERATURE: 98 F | SYSTOLIC BLOOD PRESSURE: 96 MMHG | OXYGEN SATURATION: 99 % | WEIGHT: 149 LBS

## 2023-10-17 DIAGNOSIS — Z00.00 WELLNESS EXAMINATION: Primary | ICD-10-CM

## 2023-10-17 DIAGNOSIS — F32.A ANXIETY AND DEPRESSION: Chronic | ICD-10-CM

## 2023-10-17 DIAGNOSIS — I47.10 SVT (SUPRAVENTRICULAR TACHYCARDIA): Chronic | ICD-10-CM

## 2023-10-17 DIAGNOSIS — F41.9 ANXIETY AND DEPRESSION: Chronic | ICD-10-CM

## 2023-10-17 PROCEDURE — 3074F PR MOST RECENT SYSTOLIC BLOOD PRESSURE < 130 MM HG: ICD-10-PCS | Mod: CPTII,,, | Performed by: INTERNAL MEDICINE

## 2023-10-17 PROCEDURE — 1159F MED LIST DOCD IN RCRD: CPT | Mod: CPTII,,, | Performed by: INTERNAL MEDICINE

## 2023-10-17 PROCEDURE — 3074F SYST BP LT 130 MM HG: CPT | Mod: CPTII,,, | Performed by: INTERNAL MEDICINE

## 2023-10-17 PROCEDURE — 1160F PR REVIEW ALL MEDS BY PRESCRIBER/CLIN PHARMACIST DOCUMENTED: ICD-10-PCS | Mod: CPTII,,, | Performed by: INTERNAL MEDICINE

## 2023-10-17 PROCEDURE — 1159F PR MEDICATION LIST DOCUMENTED IN MEDICAL RECORD: ICD-10-PCS | Mod: CPTII,,, | Performed by: INTERNAL MEDICINE

## 2023-10-17 PROCEDURE — 3078F PR MOST RECENT DIASTOLIC BLOOD PRESSURE < 80 MM HG: ICD-10-PCS | Mod: CPTII,,, | Performed by: INTERNAL MEDICINE

## 2023-10-17 PROCEDURE — 99396 PR PREVENTIVE VISIT,EST,40-64: ICD-10-PCS | Mod: ,,, | Performed by: INTERNAL MEDICINE

## 2023-10-17 PROCEDURE — 99396 PREV VISIT EST AGE 40-64: CPT | Mod: ,,, | Performed by: INTERNAL MEDICINE

## 2023-10-17 PROCEDURE — 3044F HG A1C LEVEL LT 7.0%: CPT | Mod: CPTII,,, | Performed by: INTERNAL MEDICINE

## 2023-10-17 PROCEDURE — 1160F RVW MEDS BY RX/DR IN RCRD: CPT | Mod: CPTII,,, | Performed by: INTERNAL MEDICINE

## 2023-10-17 PROCEDURE — 3008F BODY MASS INDEX DOCD: CPT | Mod: CPTII,,, | Performed by: INTERNAL MEDICINE

## 2023-10-17 PROCEDURE — 3044F PR MOST RECENT HEMOGLOBIN A1C LEVEL <7.0%: ICD-10-PCS | Mod: CPTII,,, | Performed by: INTERNAL MEDICINE

## 2023-10-17 PROCEDURE — 3008F PR BODY MASS INDEX (BMI) DOCUMENTED: ICD-10-PCS | Mod: CPTII,,, | Performed by: INTERNAL MEDICINE

## 2023-10-17 PROCEDURE — 3078F DIAST BP <80 MM HG: CPT | Mod: CPTII,,, | Performed by: INTERNAL MEDICINE

## 2023-10-17 RX ORDER — FLUTICASONE PROPIONATE 50 MCG
1 SPRAY, SUSPENSION (ML) NASAL DAILY PRN
COMMUNITY
Start: 2023-05-30 | End: 2023-10-17

## 2023-10-17 RX ORDER — CHOLECALCIFEROL (VITAMIN D3) 25 MCG
5000 TABLET ORAL DAILY
COMMUNITY

## 2023-10-17 NOTE — PROGRESS NOTES
Subjective:      Patient ID: Lyndsay Alicia is a 42 y.o. female.    Chief Complaint: Annual Exam    Lyndsay is a 42-year-old female with SVT s/p ablation, anxiety, and vitamin-D deficiency here today for six-month follow-up and wellness visit..    Wellness labs are reviewed and noted to be essentially normal.  Heart rate is well controlled on metoprolol.    Blood pressure on the lower side of normal however patient is asymptomatic.      Wellness: 10/17/23     Electrophysiology MD: Dr. Vitale    The patient's Health Maintenance was reviewed and the following appears to be due at this time:   Health Maintenance Due   Topic Date Due    Hepatitis C Screening  Never done    Influenza Vaccine (1) 09/01/2023    COVID-19 Vaccine (4 - 2023-24 season) 09/01/2023    Mammogram  12/07/2023        Past Medical History:  Past Medical History:   Diagnosis Date    Pain     Vitamin D deficiency      Past Surgical History:   Procedure Laterality Date    CARDIAC ELECTROPHYSIOLOGY MAPPING AND ABLATION      EYE SURGERY  2006    I dont remember exactly     Review of patient's allergies indicates:   Allergen Reactions    Codeine Nausea Only     Social History     Socioeconomic History    Marital status:    Tobacco Use    Smoking status: Never    Smokeless tobacco: Never   Substance and Sexual Activity    Alcohol use: Not Currently    Drug use: Never    Sexual activity: Yes     Partners: Male     Birth control/protection: Partner-Vasectomy     Social Determinants of Health     Financial Resource Strain: Low Risk  (4/13/2023)    Overall Financial Resource Strain (CARDIA)     Difficulty of Paying Living Expenses: Not hard at all   Food Insecurity: No Food Insecurity (4/13/2023)    Hunger Vital Sign     Worried About Running Out of Food in the Last Year: Never true     Ran Out of Food in the Last Year: Never true   Transportation Needs: No Transportation Needs (4/13/2023)    PRAPARE - Transportation     Lack of Transportation  "(Medical): No     Lack of Transportation (Non-Medical): No   Physical Activity: Sufficiently Active (4/13/2023)    Exercise Vital Sign     Days of Exercise per Week: 5 days     Minutes of Exercise per Session: 50 min   Stress: No Stress Concern Present (4/13/2023)    [employer] [place] [employer] - Occupational Stress Questionnaire     Feeling of Stress : Not at all   Social Connections: Socially Integrated (4/13/2023)    Social Connection and Isolation Panel [NHANES]     Frequency of Communication with Friends and Family: More than three times a week     Frequency of Social Gatherings with Friends and Family: More than three times a week     Attends Christian Services: More than 4 times per year     Active Member of Clubs or Organizations: Yes     Attends Club or Organization Meetings: More than 4 times per year     Marital Status:    Housing Stability: Unknown (4/13/2023)    Housing Stability Vital Sign     Unable to Pay for Housing in the Last Year: No     Unstable Housing in the Last Year: No     Family History   Problem Relation Age of Onset    Heart failure Mother     Breast cancer Mother     Cancer Mother     Depression Mother     Leukemia Father     Cancer Father     Early death Father     Hypertension Brother        Review of Systems    A comprehensive review of systems was performed and is negative except for that stated above  Objective:   BP 96/64 (BP Location: Left arm, Patient Position: Sitting, BP Method: Small (Manual))   Pulse 80   Temp 98 °F (36.7 °C) (Temporal)   Ht 5' 7" (1.702 m)   Wt 67.6 kg (149 lb)   LMP 09/29/2023 (Exact Date)   SpO2 99%   BMI 23.34 kg/m²     Physical Exam  Constitutional:       Appearance: Normal appearance.   HENT:      Head: Normocephalic and atraumatic.      Nose: Nose normal.      Mouth/Throat:      Mouth: Mucous membranes are moist.      Pharynx: Oropharynx is clear.   Eyes:      Extraocular Movements: Extraocular movements intact.      Pupils: " Pupils are equal, round, and reactive to light.   Cardiovascular:      Rate and Rhythm: Normal rate and regular rhythm.      Pulses: Normal pulses.   Pulmonary:      Effort: Pulmonary effort is normal.      Breath sounds: Normal breath sounds.   Abdominal:      General: Bowel sounds are normal.      Palpations: Abdomen is soft.   Musculoskeletal:         General: Normal range of motion.      Cervical back: Normal range of motion and neck supple.   Skin:     General: Skin is warm.   Neurological:      General: No focal deficit present.      Mental Status: She is alert and oriented to person, place, and time. Mental status is at baseline.   Psychiatric:         Mood and Affect: Mood normal.       Assessment/ Plan:   1. Wellness examination  Assessment & Plan:  -labs are reviewed all essentially normal  -patient is advised on importance of watching her carbohydrate intake and saturated fat intake, making the right nutritional choices and exercising on a regular basis  -up-to-date with the screening      2. Anxiety and depression  Assessment & Plan:  -stable without meds, patient has been trying OTC Asvagandha  which seems to be helping, advised to consider      3. SVT (supraventricular tachycardia)  Assessment & Plan:  -rate is well controlled, blood pressure is well controlled   -on metoprolol, continue

## 2023-10-17 NOTE — LETTER
AUTHORIZATION FOR RELEASE OF   CONFIDENTIAL INFORMATION    Dear Dr Nash    We are seeing Lyndsay Alicia, date of birth 1980, in the clinic at [place] [address] [facility]. Amaya Feliciano MD is the patient's PCP. Lyndsay Alicia has an outstanding lab/procedure at the time we reviewed her chart. In order to help keep her health information updated, she has authorized us to request the following medical record(s):        (  )  MAMMOGRAM                                      (  )  COLONOSCOPY      (X)  PAP SMEAR                                          (  )  OUTSIDE LAB RESULTS     (  )  DEXA SCAN                                          (  )  EYE EXAM            (  )  FOOT EXAM                                          (  )  ENTIRE RECORD     (  )  OUTSIDE IMMUNIZATIONS                 (  )  _______________         Please fax records to [facility], Bhanushali, Reshma A, MD, [phone]            Patient Name: Lyndsay Alicia  : 1980  Patient Phone #: [phone]

## 2023-10-17 NOTE — ASSESSMENT & PLAN NOTE
-stable without meds, patient has been trying OTC Asvagandha  which seems to be helping, advised to consider

## 2023-10-19 ENCOUNTER — PATIENT OUTREACH (OUTPATIENT)
Dept: ADMINISTRATIVE | Facility: HOSPITAL | Age: 43
End: 2023-10-19
Payer: COMMERCIAL

## 2023-10-19 NOTE — PROGRESS NOTES
The following record(s)  below were uploaded for Health Maintenance .              PAP SMEAR/HPV SCREENING  08/16/23     [facility].

## 2023-12-07 ENCOUNTER — TELEPHONE (OUTPATIENT)
Dept: INTERNAL MEDICINE | Facility: CLINIC | Age: 43
End: 2023-12-07
Payer: COMMERCIAL

## 2023-12-07 DIAGNOSIS — Z12.31 BREAST CANCER SCREENING BY MAMMOGRAM: Primary | ICD-10-CM

## 2023-12-07 NOTE — TELEPHONE ENCOUNTER
----- Message from Laura Connolly sent at 12/7/2023  3:54 PM CST -----  .Type:  Mammogram    Caller is requesting to schedule their annual mammogram appointment.  Order is not listed in EPIC.  Please enter order and contact patient to schedule.  Name of Caller:Lyndsay  Where would they like the mammogram performed?[facility]  Would the patient rather a call back or a response via [facility]?   Best Call Back Number:[phone]  Additional Information: Please put in orders for mammogram. Please call back

## 2024-01-22 PROBLEM — Z00.00 WELLNESS EXAMINATION: Status: RESOLVED | Noted: 2022-10-13 | Resolved: 2024-01-22

## 2024-10-10 DIAGNOSIS — E55.9 VITAMIN D DEFICIENCY: ICD-10-CM

## 2024-10-10 DIAGNOSIS — Z00.00 WELLNESS EXAMINATION: Primary | ICD-10-CM

## 2024-10-11 ENCOUNTER — TELEPHONE (OUTPATIENT)
Dept: INTERNAL MEDICINE | Facility: CLINIC | Age: 44
End: 2024-10-11
Payer: COMMERCIAL

## 2024-10-15 ENCOUNTER — LAB VISIT (OUTPATIENT)
Dept: LAB | Facility: HOSPITAL | Age: 44
End: 2024-10-15
Attending: INTERNAL MEDICINE
Payer: COMMERCIAL

## 2024-10-15 DIAGNOSIS — E55.9 VITAMIN D DEFICIENCY: ICD-10-CM

## 2024-10-15 DIAGNOSIS — Z00.00 WELLNESS EXAMINATION: ICD-10-CM

## 2024-10-15 LAB
25(OH)D3+25(OH)D2 SERPL-MCNC: 57 NG/ML (ref 30–80)
ALBUMIN SERPL-MCNC: 4 G/DL (ref 3.5–5)
ALBUMIN/GLOB SERPL: 1.7 RATIO (ref 1.1–2)
ALP SERPL-CCNC: 47 UNIT/L (ref 40–150)
ALT SERPL-CCNC: 13 UNIT/L (ref 0–55)
ANION GAP SERPL CALC-SCNC: 5 MEQ/L
AST SERPL-CCNC: 16 UNIT/L (ref 5–34)
BASOPHILS # BLD AUTO: 0.03 X10(3)/MCL
BASOPHILS NFR BLD AUTO: 0.6 %
BILIRUB SERPL-MCNC: 0.7 MG/DL
BUN SERPL-MCNC: 14.6 MG/DL (ref 7–18.7)
CALCIUM SERPL-MCNC: 9 MG/DL (ref 8.4–10.2)
CHLORIDE SERPL-SCNC: 109 MMOL/L (ref 98–107)
CHOLEST SERPL-MCNC: 143 MG/DL
CHOLEST/HDLC SERPL: 3 {RATIO} (ref 0–5)
CO2 SERPL-SCNC: 27 MMOL/L (ref 22–29)
CREAT SERPL-MCNC: 0.71 MG/DL (ref 0.55–1.02)
CREAT/UREA NIT SERPL: 21
EOSINOPHIL # BLD AUTO: 0.01 X10(3)/MCL (ref 0–0.9)
EOSINOPHIL NFR BLD AUTO: 0.2 %
ERYTHROCYTE [DISTWIDTH] IN BLOOD BY AUTOMATED COUNT: 11.5 % (ref 11.5–17)
EST. AVERAGE GLUCOSE BLD GHB EST-MCNC: 96.8 MG/DL
GFR SERPLBLD CREATININE-BSD FMLA CKD-EPI: >60 ML/MIN/1.73/M2
GLOBULIN SER-MCNC: 2.3 GM/DL (ref 2.4–3.5)
GLUCOSE SERPL-MCNC: 93 MG/DL (ref 74–100)
HBA1C MFR BLD: 5 %
HCT VFR BLD AUTO: 39.3 % (ref 37–47)
HDLC SERPL-MCNC: 48 MG/DL (ref 35–60)
HGB BLD-MCNC: 12.7 G/DL (ref 12–16)
IMM GRANULOCYTES # BLD AUTO: 0.02 X10(3)/MCL (ref 0–0.04)
IMM GRANULOCYTES NFR BLD AUTO: 0.4 %
LDLC SERPL CALC-MCNC: 84 MG/DL (ref 50–140)
LYMPHOCYTES # BLD AUTO: 1.58 X10(3)/MCL (ref 0.6–4.6)
LYMPHOCYTES NFR BLD AUTO: 30.7 %
MCH RBC QN AUTO: 30.2 PG (ref 27–31)
MCHC RBC AUTO-ENTMCNC: 32.3 G/DL (ref 33–36)
MCV RBC AUTO: 93.6 FL (ref 80–94)
MONOCYTES # BLD AUTO: 0.56 X10(3)/MCL (ref 0.1–1.3)
MONOCYTES NFR BLD AUTO: 10.9 %
NEUTROPHILS # BLD AUTO: 2.94 X10(3)/MCL (ref 2.1–9.2)
NEUTROPHILS NFR BLD AUTO: 57.2 %
NRBC BLD AUTO-RTO: 0 %
PLATELET # BLD AUTO: 208 X10(3)/MCL (ref 130–400)
PMV BLD AUTO: 11.1 FL (ref 7.4–10.4)
POTASSIUM SERPL-SCNC: 4.8 MMOL/L (ref 3.5–5.1)
PROT SERPL-MCNC: 6.3 GM/DL (ref 6.4–8.3)
RBC # BLD AUTO: 4.2 X10(6)/MCL (ref 4.2–5.4)
SODIUM SERPL-SCNC: 141 MMOL/L (ref 136–145)
TRIGL SERPL-MCNC: 54 MG/DL (ref 37–140)
TSH SERPL-ACNC: 1.37 UIU/ML (ref 0.35–4.94)
VLDLC SERPL CALC-MCNC: 11 MG/DL
WBC # BLD AUTO: 5.14 X10(3)/MCL (ref 4.5–11.5)

## 2024-10-15 PROCEDURE — 82306 VITAMIN D 25 HYDROXY: CPT

## 2024-10-15 PROCEDURE — 84443 ASSAY THYROID STIM HORMONE: CPT

## 2024-10-15 PROCEDURE — 80061 LIPID PANEL: CPT

## 2024-10-15 PROCEDURE — 36415 COLL VENOUS BLD VENIPUNCTURE: CPT

## 2024-10-15 PROCEDURE — 83036 HEMOGLOBIN GLYCOSYLATED A1C: CPT

## 2024-10-15 PROCEDURE — 85025 COMPLETE CBC W/AUTO DIFF WBC: CPT

## 2024-10-15 PROCEDURE — 80053 COMPREHEN METABOLIC PANEL: CPT

## 2024-10-17 NOTE — PROGRESS NOTES
Subjective:      Patient ID: Lyndsay Alicia is a 43 y.o. female.    Chief Complaint: Annual Exam    Lyndsay is a 43-year-old female with SVT s/p ablation, anxiety, and vitamin-D deficiency here today for a wellness visit..    Wellness labs are reviewed and noted to be essentially normal.  Heart rate is well controlled on metoprolol usually except for early in the morning when it jumps up to 130 sometimes.  Patient was advised to switch taking her metoprolol at night and see how she does with that.  If still continues to have issues, may go up on the dose or make b.i.d. dosing  Between last visit and now patient was diagnosed with a uterine fibroid and is being followed by Gynecology with an ultrasound scheduled for six-months  Blood pressure on the lower side of normal however patient is asymptomatic.       Wellness: 10/17/23     Electrophysiology MD: Dr. Vitale    The patient's Health Maintenance was reviewed and the following appears to be due at this time:   Health Maintenance Due   Topic Date Due    Hepatitis C Screening  Never done    Influenza Vaccine (1) 09/01/2024    COVID-19 Vaccine (4 - 2024-25 season) 09/01/2024    TETANUS VACCINE  10/07/2024    Mammogram  12/11/2024        Past Medical History:  Past Medical History:   Diagnosis Date    Pain     Vitamin D deficiency      Past Surgical History:   Procedure Laterality Date    CARDIAC ELECTROPHYSIOLOGY MAPPING AND ABLATION      EYE SURGERY  2006    I dont remember exactly     Review of patient's allergies indicates:   Allergen Reactions    Codeine Nausea Only     Social History     Socioeconomic History    Marital status:    Tobacco Use    Smoking status: Never    Smokeless tobacco: Never   Substance and Sexual Activity    Alcohol use: Not Currently    Drug use: Never    Sexual activity: Yes     Partners: Male     Birth control/protection: Partner-Vasectomy     Social Drivers of Health     Financial Resource Strain: Low Risk  (10/18/2024)     "Overall Financial Resource Strain (CARDIA)     Difficulty of Paying Living Expenses: Not hard at all   Food Insecurity: No Food Insecurity (10/18/2024)    Hunger Vital Sign     Worried About Running Out of Food in the Last Year: Never true     Ran Out of Food in the Last Year: Never true   Transportation Needs: No Transportation Needs (10/18/2024)    TRANSPORTATION NEEDS     Transportation : No   Physical Activity: Sufficiently Active (10/18/2024)    Exercise Vital Sign     Days of Exercise per Week: 5 days     Minutes of Exercise per Session: 50 min   Stress: No Stress Concern Present (10/18/2024)    [place] of [employer]ational [employer] - Occupational Stress Questionnaire     Feeling of Stress : Not at all   Housing Stability: Low Risk  (10/18/2024)    Housing Stability Vital Sign     Unable to Pay for Housing in the Last Year: No     Homeless in the Last Year: No     Family History   Problem Relation Name Age of Onset    Heart failure Mother Shanae     Breast cancer Mother Shanae     Cancer Mother Shanae     Depression Mother Shanae     Leukemia Father Al     Cancer Father Al     Early death Father Al     Hypertension Brother Earle        Review of Systems    A comprehensive review of systems was performed and is negative except for that stated above  Objective:   /70 (BP Location: Left arm, Patient Position: Sitting)   Pulse 63   Resp 18   Ht 5' 7" (1.702 m)   Wt 69.2 kg (152 lb 9.6 oz)   LMP 10/17/2024   SpO2 100%   BMI 23.90 kg/m²     Physical Exam  Constitutional:       Appearance: Normal appearance.   HENT:      Head: Normocephalic and atraumatic.      Nose: Nose normal.      Mouth/Throat:      Mouth: Mucous membranes are moist.      Pharynx: Oropharynx is clear.   Eyes:      Extraocular Movements: Extraocular movements intact.      Pupils: Pupils are equal, round, and reactive to light.   Cardiovascular:      Rate and Rhythm: Normal rate and regular rhythm.      Pulses: Normal pulses.   Pulmonary:      " Effort: Pulmonary effort is normal.      Breath sounds: Normal breath sounds.   Abdominal:      General: Bowel sounds are normal.      Palpations: Abdomen is soft.   Musculoskeletal:         General: Normal range of motion.      Cervical back: Normal range of motion and neck supple.   Skin:     General: Skin is warm.   Neurological:      General: No focal deficit present.      Mental Status: She is alert and oriented to person, place, and time. Mental status is at baseline.   Psychiatric:         Mood and Affect: Mood normal.       Assessment/ Plan:   1. Wellness examination  Assessment & Plan:  -labs are reviewed all essentially normal  -patient is advised on importance of watching her carbohydrate intake and saturated fat intake, making the right nutritional choices and exercising on a regular basis  -up-to-date with the screening       2. Anxiety and depression  Assessment & Plan:  -stable without meds, patient has been trying OTC Asvagandha  which seems to be helping, advised to consider         3. SVT (supraventricular tachycardia)  Assessment & Plan:  -rate is well controlled, blood pressure is well controlled   -on metoprolol, continue, advised to switch it to nighttime

## 2024-10-18 ENCOUNTER — OFFICE VISIT (OUTPATIENT)
Dept: INTERNAL MEDICINE | Facility: CLINIC | Age: 44
End: 2024-10-18
Payer: COMMERCIAL

## 2024-10-18 VITALS
SYSTOLIC BLOOD PRESSURE: 110 MMHG | DIASTOLIC BLOOD PRESSURE: 70 MMHG | RESPIRATION RATE: 18 BRPM | HEART RATE: 63 BPM | WEIGHT: 152.63 LBS | OXYGEN SATURATION: 100 % | BODY MASS INDEX: 23.96 KG/M2 | HEIGHT: 67 IN

## 2024-10-18 DIAGNOSIS — I47.10 SVT (SUPRAVENTRICULAR TACHYCARDIA): Chronic | ICD-10-CM

## 2024-10-18 DIAGNOSIS — F32.A ANXIETY AND DEPRESSION: Chronic | ICD-10-CM

## 2024-10-18 DIAGNOSIS — Z00.00 WELLNESS EXAMINATION: Primary | ICD-10-CM

## 2024-10-18 DIAGNOSIS — F41.9 ANXIETY AND DEPRESSION: Chronic | ICD-10-CM

## 2024-10-18 RX ORDER — MULTIVITAMIN
1 TABLET ORAL DAILY
COMMUNITY

## 2024-10-18 RX ORDER — PARSLEY 450 MG
500 CAPSULE ORAL DAILY
COMMUNITY
Start: 2023-11-01

## 2024-10-18 NOTE — ASSESSMENT & PLAN NOTE
-rate is well controlled, blood pressure is well controlled   -on metoprolol, continue, advised to switch it to nighttime